# Patient Record
Sex: MALE | Race: WHITE | NOT HISPANIC OR LATINO | Employment: OTHER | URBAN - METROPOLITAN AREA
[De-identification: names, ages, dates, MRNs, and addresses within clinical notes are randomized per-mention and may not be internally consistent; named-entity substitution may affect disease eponyms.]

---

## 2018-09-09 ENCOUNTER — APPOINTMENT (EMERGENCY)
Dept: RADIOLOGY | Facility: HOSPITAL | Age: 83
End: 2018-09-09
Payer: MEDICARE

## 2018-09-09 ENCOUNTER — APPOINTMENT (EMERGENCY)
Dept: CT IMAGING | Facility: HOSPITAL | Age: 83
End: 2018-09-09
Payer: MEDICARE

## 2018-09-09 ENCOUNTER — HOSPITAL ENCOUNTER (OUTPATIENT)
Facility: HOSPITAL | Age: 83
Setting detail: OBSERVATION
Discharge: HOME HEALTH | End: 2018-09-10
Attending: EMERGENCY MEDICINE | Admitting: GENERAL PRACTICE
Payer: MEDICARE

## 2018-09-09 DIAGNOSIS — T14.8XXA MULTIPLE SKIN TEARS: ICD-10-CM

## 2018-09-09 DIAGNOSIS — R29.6 MULTIPLE FALLS: ICD-10-CM

## 2018-09-09 DIAGNOSIS — S70.01XA HEMATOMA OF RIGHT HIP: Primary | ICD-10-CM

## 2018-09-09 DIAGNOSIS — L03.113 CELLULITIS OF RIGHT FOREARM: ICD-10-CM

## 2018-09-09 DIAGNOSIS — R60.0 EDEMA AT INJECTION SITE: ICD-10-CM

## 2018-09-09 PROBLEM — I50.32 CHRONIC DIASTOLIC CONGESTIVE HEART FAILURE (HCC): Status: ACTIVE | Noted: 2017-11-21

## 2018-09-09 PROBLEM — I50.31 ACUTE DIASTOLIC CHF (CONGESTIVE HEART FAILURE) (HCC): Status: ACTIVE | Noted: 2017-12-11

## 2018-09-09 PROBLEM — I25.10 ATHEROSCLEROTIC HEART DISEASE OF NATIVE CORONARY ARTERY WITHOUT ANGINA PECTORIS: Status: ACTIVE | Noted: 2017-11-21

## 2018-09-09 PROBLEM — C44.602 SKIN CANCER OF ARM, RIGHT: Status: ACTIVE | Noted: 2017-11-21

## 2018-09-09 PROBLEM — M48.02 CERVICAL SPINAL STENOSIS: Status: ACTIVE | Noted: 2017-11-21

## 2018-09-09 PROBLEM — R26.2 AMBULATORY DYSFUNCTION: Status: ACTIVE | Noted: 2018-09-09

## 2018-09-09 PROBLEM — I05.9 MITRAL VALVE DISORDER: Status: ACTIVE | Noted: 2018-02-01

## 2018-09-09 PROBLEM — N17.9 ACUTE RENAL FAILURE SUPERIMPOSED ON STAGE 3 CHRONIC KIDNEY DISEASE (HCC): Status: ACTIVE | Noted: 2017-12-11

## 2018-09-09 PROBLEM — N18.30 ACUTE RENAL FAILURE SUPERIMPOSED ON STAGE 3 CHRONIC KIDNEY DISEASE (HCC): Status: ACTIVE | Noted: 2017-12-11

## 2018-09-09 PROBLEM — S41.119A SKIN TEAR OF UPPER ARM WITHOUT COMPLICATION: Status: ACTIVE | Noted: 2018-09-09

## 2018-09-09 PROBLEM — E78.00 PURE HYPERCHOLESTEROLEMIA: Status: ACTIVE | Noted: 2017-11-21

## 2018-09-09 PROBLEM — I10 ESSENTIAL (PRIMARY) HYPERTENSION: Status: ACTIVE | Noted: 2017-11-21

## 2018-09-09 PROBLEM — E55.9 VITAMIN D DEFICIENCY: Status: ACTIVE | Noted: 2017-11-21

## 2018-09-09 PROBLEM — I48.0 PAROXYSMAL ATRIAL FIBRILLATION (HCC): Status: ACTIVE | Noted: 2018-07-31

## 2018-09-09 PROBLEM — R33.9 URINARY RETENTION: Status: ACTIVE | Noted: 2017-12-11

## 2018-09-09 PROBLEM — E11.9 CONTROLLED TYPE 2 DIABETES MELLITUS WITHOUT COMPLICATION, WITHOUT LONG-TERM CURRENT USE OF INSULIN (HCC): Status: ACTIVE | Noted: 2017-11-21

## 2018-09-09 PROBLEM — R42 DIZZINESS: Status: ACTIVE | Noted: 2017-11-21

## 2018-09-09 LAB
ALBUMIN SERPL BCP-MCNC: 3.2 G/DL (ref 3.5–5)
ALP SERPL-CCNC: 56 U/L (ref 46–116)
ALT SERPL W P-5'-P-CCNC: 26 U/L (ref 12–78)
ANION GAP SERPL CALCULATED.3IONS-SCNC: 3 MMOL/L (ref 4–13)
APTT PPP: 26 SECONDS (ref 24–36)
AST SERPL W P-5'-P-CCNC: 26 U/L (ref 5–45)
ATRIAL RATE: 75 BPM
BASOPHILS # BLD AUTO: 0.01 THOUSANDS/ΜL (ref 0–0.1)
BASOPHILS NFR BLD AUTO: 0 % (ref 0–1)
BILIRUB DIRECT SERPL-MCNC: 0.24 MG/DL (ref 0–0.2)
BILIRUB SERPL-MCNC: 0.8 MG/DL (ref 0.2–1)
BILIRUB UR QL STRIP: NEGATIVE
BUN SERPL-MCNC: 27 MG/DL (ref 5–25)
CALCIUM SERPL-MCNC: 8.8 MG/DL (ref 8.3–10.1)
CHLORIDE SERPL-SCNC: 102 MMOL/L (ref 100–108)
CLARITY UR: CLEAR
CO2 SERPL-SCNC: 36 MMOL/L (ref 21–32)
COLOR UR: YELLOW
CREAT SERPL-MCNC: 1.26 MG/DL (ref 0.6–1.3)
EOSINOPHIL # BLD AUTO: 0.22 THOUSAND/ΜL (ref 0–0.61)
EOSINOPHIL NFR BLD AUTO: 3 % (ref 0–6)
ERYTHROCYTE [DISTWIDTH] IN BLOOD BY AUTOMATED COUNT: 14.4 % (ref 11.6–15.1)
GFR SERPL CREATININE-BSD FRML MDRD: 51 ML/MIN/1.73SQ M
GLUCOSE SERPL-MCNC: 104 MG/DL (ref 65–140)
GLUCOSE UR STRIP-MCNC: NEGATIVE MG/DL
HCT VFR BLD AUTO: 37.4 % (ref 36.5–49.3)
HGB BLD-MCNC: 12.1 G/DL (ref 12–17)
HGB UR QL STRIP.AUTO: NEGATIVE
IMM GRANULOCYTES # BLD AUTO: 0.02 THOUSAND/UL (ref 0–0.2)
IMM GRANULOCYTES NFR BLD AUTO: 0 % (ref 0–2)
INR PPP: 1.19 (ref 0.86–1.17)
KETONES UR STRIP-MCNC: NEGATIVE MG/DL
LEUKOCYTE ESTERASE UR QL STRIP: NEGATIVE
LIPASE SERPL-CCNC: 149 U/L (ref 73–393)
LYMPHOCYTES # BLD AUTO: 1.27 THOUSANDS/ΜL (ref 0.6–4.47)
LYMPHOCYTES NFR BLD AUTO: 15 % (ref 14–44)
MAGNESIUM SERPL-MCNC: 1.7 MG/DL (ref 1.6–2.6)
MCH RBC QN AUTO: 29.9 PG (ref 26.8–34.3)
MCHC RBC AUTO-ENTMCNC: 32.4 G/DL (ref 31.4–37.4)
MCV RBC AUTO: 92 FL (ref 82–98)
MONOCYTES # BLD AUTO: 0.67 THOUSAND/ΜL (ref 0.17–1.22)
MONOCYTES NFR BLD AUTO: 8 % (ref 4–12)
NEUTROPHILS # BLD AUTO: 6.21 THOUSANDS/ΜL (ref 1.85–7.62)
NEUTS SEG NFR BLD AUTO: 74 % (ref 43–75)
NITRITE UR QL STRIP: NEGATIVE
NRBC BLD AUTO-RTO: 0 /100 WBCS
P AXIS: 83 DEGREES
PH UR STRIP.AUTO: 6 [PH] (ref 4.5–8)
PHOSPHATE SERPL-MCNC: 3.3 MG/DL (ref 2.3–4.1)
PLATELET # BLD AUTO: 133 THOUSANDS/UL (ref 149–390)
PMV BLD AUTO: 11.5 FL (ref 8.9–12.7)
POTASSIUM SERPL-SCNC: 4.3 MMOL/L (ref 3.5–5.3)
PR INTERVAL: 146 MS
PROT SERPL-MCNC: 6.5 G/DL (ref 6.4–8.2)
PROT UR STRIP-MCNC: NEGATIVE MG/DL
PROTHROMBIN TIME: 15 SECONDS (ref 11.8–14.2)
QRS AXIS: 65 DEGREES
QRSD INTERVAL: 88 MS
QT INTERVAL: 416 MS
QTC INTERVAL: 464 MS
RBC # BLD AUTO: 4.05 MILLION/UL (ref 3.88–5.62)
SODIUM SERPL-SCNC: 141 MMOL/L (ref 136–145)
SP GR UR STRIP.AUTO: <=1.005 (ref 1–1.03)
T WAVE AXIS: 56 DEGREES
TROPONIN I SERPL-MCNC: 0.03 NG/ML
TROPONIN I SERPL-MCNC: 0.04 NG/ML
UROBILINOGEN UR QL STRIP.AUTO: 0.2 E.U./DL
VENTRICULAR RATE: 75 BPM
WBC # BLD AUTO: 8.4 THOUSAND/UL (ref 4.31–10.16)

## 2018-09-09 PROCEDURE — 80048 BASIC METABOLIC PNL TOTAL CA: CPT | Performed by: EMERGENCY MEDICINE

## 2018-09-09 PROCEDURE — 87070 CULTURE OTHR SPECIMN AEROBIC: CPT | Performed by: EMERGENCY MEDICINE

## 2018-09-09 PROCEDURE — 71046 X-RAY EXAM CHEST 2 VIEWS: CPT

## 2018-09-09 PROCEDURE — 73564 X-RAY EXAM KNEE 4 OR MORE: CPT

## 2018-09-09 PROCEDURE — 99285 EMERGENCY DEPT VISIT HI MDM: CPT

## 2018-09-09 PROCEDURE — 96375 TX/PRO/DX INJ NEW DRUG ADDON: CPT

## 2018-09-09 PROCEDURE — 99223 1ST HOSP IP/OBS HIGH 75: CPT | Performed by: GENERAL PRACTICE

## 2018-09-09 PROCEDURE — 85730 THROMBOPLASTIN TIME PARTIAL: CPT | Performed by: EMERGENCY MEDICINE

## 2018-09-09 PROCEDURE — 84484 ASSAY OF TROPONIN QUANT: CPT | Performed by: EMERGENCY MEDICINE

## 2018-09-09 PROCEDURE — 85610 PROTHROMBIN TIME: CPT | Performed by: EMERGENCY MEDICINE

## 2018-09-09 PROCEDURE — 80076 HEPATIC FUNCTION PANEL: CPT | Performed by: EMERGENCY MEDICINE

## 2018-09-09 PROCEDURE — 93005 ELECTROCARDIOGRAM TRACING: CPT

## 2018-09-09 PROCEDURE — 96361 HYDRATE IV INFUSION ADD-ON: CPT

## 2018-09-09 PROCEDURE — 73502 X-RAY EXAM HIP UNI 2-3 VIEWS: CPT

## 2018-09-09 PROCEDURE — 84100 ASSAY OF PHOSPHORUS: CPT | Performed by: EMERGENCY MEDICINE

## 2018-09-09 PROCEDURE — 70450 CT HEAD/BRAIN W/O DYE: CPT

## 2018-09-09 PROCEDURE — 83735 ASSAY OF MAGNESIUM: CPT | Performed by: EMERGENCY MEDICINE

## 2018-09-09 PROCEDURE — 96365 THER/PROPH/DIAG IV INF INIT: CPT

## 2018-09-09 PROCEDURE — 93010 ELECTROCARDIOGRAM REPORT: CPT | Performed by: INTERNAL MEDICINE

## 2018-09-09 PROCEDURE — 87205 SMEAR GRAM STAIN: CPT | Performed by: EMERGENCY MEDICINE

## 2018-09-09 PROCEDURE — 73701 CT LOWER EXTREMITY W/DYE: CPT

## 2018-09-09 PROCEDURE — 36415 COLL VENOUS BLD VENIPUNCTURE: CPT | Performed by: EMERGENCY MEDICINE

## 2018-09-09 PROCEDURE — 81003 URINALYSIS AUTO W/O SCOPE: CPT | Performed by: EMERGENCY MEDICINE

## 2018-09-09 PROCEDURE — 96360 HYDRATION IV INFUSION INIT: CPT

## 2018-09-09 PROCEDURE — 83690 ASSAY OF LIPASE: CPT | Performed by: EMERGENCY MEDICINE

## 2018-09-09 PROCEDURE — 85025 COMPLETE CBC W/AUTO DIFF WBC: CPT | Performed by: EMERGENCY MEDICINE

## 2018-09-09 RX ORDER — BACITRACIN, NEOMYCIN, POLYMYXIN B 400; 3.5; 5 [USP'U]/G; MG/G; [USP'U]/G
1 OINTMENT TOPICAL 2 TIMES DAILY
Status: DISCONTINUED | OUTPATIENT
Start: 2018-09-09 | End: 2018-09-10 | Stop reason: HOSPADM

## 2018-09-09 RX ORDER — POTASSIUM CHLORIDE 1.5 G/1.77G
10 POWDER, FOR SOLUTION ORAL DAILY
COMMUNITY
End: 2018-09-09 | Stop reason: CLARIF

## 2018-09-09 RX ORDER — OXYCODONE HYDROCHLORIDE 5 MG/1
10 CAPSULE ORAL EVERY 6 HOURS PRN
COMMUNITY

## 2018-09-09 RX ORDER — FUROSEMIDE 40 MG/1
40 TABLET ORAL AS NEEDED
COMMUNITY

## 2018-09-09 RX ORDER — ATORVASTATIN CALCIUM 10 MG/1
10 TABLET, FILM COATED ORAL
COMMUNITY

## 2018-09-09 RX ORDER — LORAZEPAM 1 MG/1
1 TABLET ORAL
Status: DISCONTINUED | OUTPATIENT
Start: 2018-09-09 | End: 2018-09-10 | Stop reason: HOSPADM

## 2018-09-09 RX ORDER — ATENOLOL 25 MG/1
25 TABLET ORAL DAILY
COMMUNITY
Start: 2018-02-05 | End: 2018-09-09 | Stop reason: CLARIF

## 2018-09-09 RX ORDER — FUROSEMIDE 40 MG/1
40 TABLET ORAL DAILY
COMMUNITY
Start: 2018-02-19 | End: 2018-09-09 | Stop reason: CLARIF

## 2018-09-09 RX ORDER — PANTOPRAZOLE SODIUM 40 MG/1
40 TABLET, DELAYED RELEASE ORAL
Status: DISCONTINUED | OUTPATIENT
Start: 2018-09-10 | End: 2018-09-10 | Stop reason: HOSPADM

## 2018-09-09 RX ORDER — POTASSIUM CHLORIDE 750 MG/1
10 TABLET, FILM COATED, EXTENDED RELEASE ORAL 2 TIMES DAILY
COMMUNITY

## 2018-09-09 RX ORDER — ONDANSETRON 2 MG/ML
4 INJECTION INTRAMUSCULAR; INTRAVENOUS ONCE
Status: COMPLETED | OUTPATIENT
Start: 2018-09-09 | End: 2018-09-09

## 2018-09-09 RX ORDER — HYDROCHLOROTHIAZIDE 25 MG/1
50 TABLET ORAL DAILY
COMMUNITY

## 2018-09-09 RX ORDER — OXYCODONE HYDROCHLORIDE 5 MG/1
5 TABLET ORAL EVERY 6 HOURS PRN
Status: DISCONTINUED | OUTPATIENT
Start: 2018-09-09 | End: 2018-09-10 | Stop reason: HOSPADM

## 2018-09-09 RX ORDER — FUROSEMIDE 40 MG/1
40 TABLET ORAL DAILY
Status: DISCONTINUED | OUTPATIENT
Start: 2018-09-10 | End: 2018-09-10 | Stop reason: HOSPADM

## 2018-09-09 RX ORDER — MORPHINE SULFATE 4 MG/ML
4 INJECTION, SOLUTION INTRAMUSCULAR; INTRAVENOUS ONCE
Status: COMPLETED | OUTPATIENT
Start: 2018-09-09 | End: 2018-09-09

## 2018-09-09 RX ORDER — POTASSIUM CHLORIDE 750 MG/1
10 TABLET, EXTENDED RELEASE ORAL 2 TIMES DAILY
Status: DISCONTINUED | OUTPATIENT
Start: 2018-09-09 | End: 2018-09-10 | Stop reason: HOSPADM

## 2018-09-09 RX ORDER — LISINOPRIL 20 MG/1
20 TABLET ORAL DAILY
Status: DISCONTINUED | OUTPATIENT
Start: 2018-09-10 | End: 2018-09-10 | Stop reason: HOSPADM

## 2018-09-09 RX ORDER — ENALAPRIL MALEATE 10 MG/1
10 TABLET ORAL DAILY
COMMUNITY

## 2018-09-09 RX ORDER — POLYETHYLENE GLYCOL 1000
1 POWDER (GRAM) MISCELLANEOUS
COMMUNITY

## 2018-09-09 RX ORDER — PHENOL 1.4 %
600 AEROSOL, SPRAY (ML) MUCOUS MEMBRANE 2 TIMES DAILY WITH MEALS
COMMUNITY

## 2018-09-09 RX ORDER — HYDROCHLOROTHIAZIDE 25 MG/1
50 TABLET ORAL DAILY
Status: DISCONTINUED | OUTPATIENT
Start: 2018-09-10 | End: 2018-09-10 | Stop reason: HOSPADM

## 2018-09-09 RX ORDER — CLOTRIMAZOLE AND BETAMETHASONE DIPROPIONATE 10; .64 MG/G; MG/G
CREAM TOPICAL
COMMUNITY
Start: 2017-09-28

## 2018-09-09 RX ORDER — OMEPRAZOLE 20 MG/1
CAPSULE, DELAYED RELEASE ORAL
COMMUNITY

## 2018-09-09 RX ORDER — CALCIUM CARBONATE 500(1250)
1 TABLET ORAL
Status: DISCONTINUED | OUTPATIENT
Start: 2018-09-10 | End: 2018-09-10 | Stop reason: HOSPADM

## 2018-09-09 RX ORDER — ATORVASTATIN CALCIUM 10 MG/1
10 TABLET, FILM COATED ORAL DAILY
COMMUNITY
Start: 2018-07-02 | End: 2018-09-09 | Stop reason: CLARIF

## 2018-09-09 RX ORDER — OXYCODONE AND ACETAMINOPHEN 10; 325 MG/1; MG/1
TABLET ORAL
COMMUNITY
Start: 2017-11-06 | End: 2018-09-09 | Stop reason: CLARIF

## 2018-09-09 RX ORDER — POLYETHYLENE GLYCOL 3350 17 G/17G
17 POWDER, FOR SOLUTION ORAL DAILY
Status: DISCONTINUED | OUTPATIENT
Start: 2018-09-10 | End: 2018-09-10 | Stop reason: HOSPADM

## 2018-09-09 RX ORDER — ATENOLOL 25 MG/1
12.5 TABLET ORAL 2 TIMES DAILY
COMMUNITY

## 2018-09-09 RX ORDER — CLOTRIMAZOLE AND BETAMETHASONE DIPROPIONATE 10; .64 MG/G; MG/G
CREAM TOPICAL 2 TIMES DAILY
Status: DISCONTINUED | OUTPATIENT
Start: 2018-09-09 | End: 2018-09-10 | Stop reason: HOSPADM

## 2018-09-09 RX ORDER — ATORVASTATIN CALCIUM 10 MG/1
10 TABLET, FILM COATED ORAL
Status: DISCONTINUED | OUTPATIENT
Start: 2018-09-09 | End: 2018-09-10 | Stop reason: HOSPADM

## 2018-09-09 RX ORDER — ENALAPRIL MALEATE 10 MG/1
10 TABLET ORAL DAILY
COMMUNITY
Start: 2018-06-29 | End: 2018-09-09 | Stop reason: CLARIF

## 2018-09-09 RX ORDER — LORAZEPAM 1 MG/1
1 TABLET ORAL
COMMUNITY

## 2018-09-09 RX ORDER — ATENOLOL 25 MG/1
12.5 TABLET ORAL 2 TIMES DAILY
Status: DISCONTINUED | OUTPATIENT
Start: 2018-09-09 | End: 2018-09-10 | Stop reason: HOSPADM

## 2018-09-09 RX ADMIN — CEFTRIAXONE 2000 MG: 2 INJECTION, SOLUTION INTRAVENOUS at 15:20

## 2018-09-09 RX ADMIN — POTASSIUM CHLORIDE 10 MEQ: 750 TABLET, EXTENDED RELEASE ORAL at 21:02

## 2018-09-09 RX ADMIN — SODIUM CHLORIDE 1000 ML: 0.9 INJECTION, SOLUTION INTRAVENOUS at 12:37

## 2018-09-09 RX ADMIN — APIXABAN 2.5 MG: 2.5 TABLET, FILM COATED ORAL at 20:59

## 2018-09-09 RX ADMIN — IOHEXOL 100 ML: 350 INJECTION, SOLUTION INTRAVENOUS at 14:56

## 2018-09-09 RX ADMIN — ONDANSETRON 4 MG: 2 INJECTION INTRAMUSCULAR; INTRAVENOUS at 14:04

## 2018-09-09 RX ADMIN — MORPHINE SULFATE 4 MG: 4 INJECTION INTRAVENOUS at 14:05

## 2018-09-09 RX ADMIN — LORAZEPAM 1 MG: 1 TABLET ORAL at 22:00

## 2018-09-09 RX ADMIN — OXYCODONE HYDROCHLORIDE 5 MG: 5 TABLET ORAL at 23:21

## 2018-09-09 NOTE — H&P
[address] [facility]  H&P- Vida Diaz 2/22/1930, 80 y o  male MRN: [id number]    Unit/Bed#: -01 Encounter: [id number]    Primary Care Provider: Ailyn Martin MD   Date and time admitted to hospital: 9/9/2018 11:28 AM        * Skin tear of upper arm without complication   Assessment & Plan    · Patient fell at home and suffered bilateral upper arm skin tears  · Dry dressing with nonstick gauze  · Neosporin  · No leukocytosis negative for fever  · Localized redness around skin tears  · Swelling is likely related to IV infiltrated in the ED swelling was not evident prior to this        Ambulatory dysfunction   Assessment & Plan    · Patient experienced a fall at home  · Fall precautions  · Right hip hematoma  · CT of hip negative  · Consult PT        Acute renal failure superimposed on stage 3 chronic kidney disease ([facility])   Assessment & Plan    Stable        Acute diastolic CHF (congestive heart failure) ([facility])   Assessment & Plan    · Stable               VTE Prophylaxis: Apixaban (Eliquis)  / sequential compression device   Code Status:  Full  POLST: POLST form is not discussed and not completed at this time  Discussion with family:  Not available    Anticipated Length of Stay:  Patient will be admitted on an Inpatient basis with an anticipated length of stay of  > 2 midnights  Justification for Hospital Stay:  Skin tear wound dressings, evaluation of ambulatory dysfunction by Physical therapy    Total Time for Visit, including Counseling / Coordination of Care: 30 minutes  Greater than 50% of this total time spent on direct patient counseling and coordination of care  Chief Complaint:   States he fell 2 weeks ago and has a bruise on his hip with hip pain    History of Present Illness:    Vida Diaz is a 80 y o  male who presents with fall  Patient came into the ED because he fell 2 weeks ago and was brought in by family    Patient denies any loss of consciousness or hitting his head   He does however still have a hip that is exhibiting signs of healing  However he is still experiencing pain  Patient and family concerned because he is on Eliquis for AFib  He also experienced bilateral upper arm skin tears from his fall  Local wound care  Denies any chest pain chest tightness shortness of breath or difficulty breathing  He is ambulating in his room without difficulty  He does not use any assistive devices at home however since his fall he has been using a walker  Denies any numbness or tingling in his right extremity  No changes in sensation  Denies a history of smoking drinking or illicit drug use    Review of Systems:    Review of Systems   Constitutional: Negative  HENT: Negative  Eyes: Negative  Respiratory: Negative  Cardiovascular: Negative  Gastrointestinal: Negative  Endocrine: Negative  Genitourinary: Negative  Musculoskeletal: Negative  Right hip pain   Skin: Negative  Allergic/Immunologic: Negative  Neurological: Negative  Psychiatric/Behavioral: Negative  All other systems reviewed and are negative  Past Medical and Surgical History:     History reviewed  No pertinent past medical history  History reviewed  No pertinent surgical history  Meds/Allergies:    Prior to Admission medications    Medication Sig Start Date End Date Taking?  Authorizing Provider   apixaban (ELIQUIS) 2 5 mg Take 2 5 mg by mouth 2 (two) times a day   Yes Historical Provider, MD   atenolol (TENORMIN) 25 mg tablet Take 12 5 mg by mouth 2 (two) times a day   Yes Historical Provider, MD   atorvastatin (LIPITOR) 10 mg tablet Take 10 mg by mouth daily at bedtime   Yes Historical Provider, MD   calcium carbonate (OS-NICOLE) 600 MG tablet Take 600 mg by mouth 2 (two) times a day with meals   Yes Historical Provider, MD   clotrimazole-betamethasone (LOTRISONE) 1-0 05 % cream 1 pat, Topical, Daily PRN 9/28/17  Yes Historical Provider, MD   diclofenac sodium (VOLTAREN) 1 % Apply 1 application topically 9/6/72 9/6/19 Yes Historical Provider, MD   enalapril (VASOTEC) 10 mg tablet Take 10 mg by mouth daily   Yes Historical Provider, MD   hydrochlorothiazide (HYDRODIURIL) 25 mg tablet Take 50 mg by mouth daily   Yes Historical Provider, MD   LORazepam (ATIVAN) 1 mg tablet Take 1 mg by mouth daily at bedtime   Yes Historical Provider, MD   oxyCODONE (OXY-IR) 5 MG capsule Take 10 mg by mouth every 6 (six) hours as needed for moderate pain   Yes Historical Provider, MD   apixaban (ELIQUIS) 2 5 mg Take 2 5 mg by mouth 2 (two) times a day 2/1/18 9/9/18 Yes Historical Provider, MD   atenolol (TENORMIN) 25 mg tablet Take 25 mg by mouth daily 2/5/18 9/9/18 Yes Historical Provider, MD   atorvastatin (LIPITOR) 10 mg tablet Take 10 mg by mouth daily 7/2/18 9/9/18 Yes Historical Provider, MD   enalapril (VASOTEC) 10 mg tablet Take 10 mg by mouth daily 6/29/18 9/9/18 Yes Historical Provider, MD   furosemide (LASIX) 40 mg tablet Take 40 mg by mouth daily 2/19/18 9/9/18 Yes Historical Provider, MD   oxyCODONE-acetaminophen (PERCOCET)  mg per tablet Take one tablet by mouth TID PRN 11/6/17 9/9/18 Yes Historical Provider, MD   potassium chloride (KLOR-CON) 20 mEq packet Take 10 mEq by mouth daily  9/9/18 Yes Historical Provider, MD   furosemide (LASIX) 40 mg tablet Take 40 mg by mouth as needed    Historical Provider, MD   Multiple Vitamins-Minerals (MULTIVITAMIN ADULT PO) Take 1 tab(s) by mouth Daily      Historical Provider, MD   omeprazole (PRILOSEC) 20 mg delayed release capsule 1 tablet    Historical Provider, MD   Polyethylene Glycol 1000 POWD 1 packet by Does not apply route    Historical Provider, MD   potassium chloride (K-DUR) 10 mEq tablet Take 10 mEq by mouth 2 (two) times a day    Historical Provider, MD   Calcium Carbonate-Vitamin D3 600-400 MG-UNIT TABS Take 1 tab(s) by mouth Daily  9/9/18  Historical Provider, MD     I have reviewed home medications with patient personally  Allergies: No Known Allergies    Social History:     Marital Status: /Civil Union   Occupation:  Retired  Patient Pre-hospital Living Situation:  With wife and daughter  Patient Pre-hospital Level of Mobility:  Independent with the use of walker  Patient Pre-hospital Diet Restrictions:  None  Substance Use History:   History   Alcohol Use No     History   Smoking Status    Never Smoker   Smokeless Tobacco    Never Used     History   Drug Use No       Family History:    History reviewed  No pertinent family history  Physical Exam:     Vitals:   Blood Pressure: 142/77 (09/09/18 1738)  Pulse: 81 (09/09/18 1738)  Temperature: 98 4 °F (36 9 °C) (09/09/18 1738)  Temp Source: Oral (09/09/18 1738)  Respirations: 18 (09/09/18 1738)  Height: 5' 7" (170 2 cm) (09/09/18 1738)  Weight - Scale: 59 8 kg (131 lb 13 4 oz) (09/09/18 1239)  SpO2: 94 % (09/09/18 1738)    Physical Exam   Constitutional: He appears well-developed and well-nourished  HENT:   Head: Normocephalic  Eyes: Pupils are equal, round, and reactive to light  Neck: Normal range of motion  Neck supple  Cardiovascular: Normal rate and regular rhythm  Pulmonary/Chest: Effort normal and breath sounds normal    Abdominal: Soft  Bowel sounds are normal    Musculoskeletal:        Right hip: He exhibits tenderness and swelling  Right   Neurological: He is alert  Skin: Skin is warm and dry  Psychiatric: He has a normal mood and affect  His speech is normal and behavior is normal  Judgment and thought content normal  Cognition and memory are normal    Nursing note and vitals reviewed  Additional Data:     Lab Results: I have personally reviewed pertinent reports          Results from last 7 days  Lab Units 09/09/18  1235   WBC Thousand/uL 8 40   HEMOGLOBIN g/dL 12 1   HEMATOCRIT % 37 4   PLATELETS Thousands/uL 133*   NEUTROS PCT % 74   LYMPHS PCT % 15   MONOS PCT % 8   EOS PCT % 3       Results from last 7 days  Lab Units 09/09/18  1235   SODIUM mmol/L 141   POTASSIUM mmol/L 4 3   CHLORIDE mmol/L 102   CO2 mmol/L 36*   BUN mg/dL 27*   CREATININE mg/dL 1 26   CALCIUM mg/dL 8 8   ALK PHOS U/L 56   ALT U/L 26   AST U/L 26       Results from last 7 days  Lab Units 09/09/18  1235   INR  1 19*               Imaging: I have personally reviewed pertinent reports  XR chest 2 views   Final Result by Inocente Velázquez MD (09/09 1715)      No acute cardiopulmonary disease  Workstation performed: [id number]         XR hip/pelv 2-3 vws right if performed   Final Result by Viviana Bales MD (09/09 1714)      No acute osseous abnormality  Workstation performed: [id number]         XR knee 4+ vw right injury   Final Result by Denise Escobar DO (09/09 1711)   Mottled osteopenia  No acute fracture or dislocation  Remarkably minimal degenerative change considering patient's advanced age  Workstation performed: [id number]         CT hip right w contrast   Final Result by Yoel Callejas MD (09/09 1523)         1  No hematoma, mass or abnormal fluid collection in the region of the right hip  2   No acute osseous abnormality  3   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

## 2018-09-09 NOTE — PLAN OF CARE
Problem: SAFETY ADULT  Goal: Patient will remain free of falls  INTERVENTIONS:  - Assess patient frequently for physical needs  -  Identify cognitive and physical deficits and behaviors that affect risk of falls    -  [place] fall precautions as indicated by assessment   - Educate patient/family on patient safety including physical limitations  - Instruct patient to call for assistance with activity based on assessment  - Modify environment to reduce risk of injury  - Consider OT/PT consult to assist with strengthening/mobility  Outcome: Progressing

## 2018-09-09 NOTE — ASSESSMENT & PLAN NOTE
· Patient experienced a fall at home  · Fall precautions  · Right hip hematoma  · CT of hip negative  · Consult PT

## 2018-09-09 NOTE — ASSESSMENT & PLAN NOTE
· Patient fell at home and suffered bilateral upper arm skin tears  · Dry dressing with nonstick gauze  · Neosporin  · No leukocytosis negative for fever  · Localized redness around skin tears  · Swelling is likely related to IV infiltrated in the ED swelling was not evident prior to this
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
## 2018-09-10 VITALS
BODY MASS INDEX: 20.69 KG/M2 | SYSTOLIC BLOOD PRESSURE: 155 MMHG | RESPIRATION RATE: 18 BRPM | HEART RATE: 66 BPM | OXYGEN SATURATION: 94 % | WEIGHT: 131.84 LBS | DIASTOLIC BLOOD PRESSURE: 64 MMHG | TEMPERATURE: 98.2 F | HEIGHT: 67 IN

## 2018-09-10 PROCEDURE — G8978 MOBILITY CURRENT STATUS: HCPCS

## 2018-09-10 PROCEDURE — 97110 THERAPEUTIC EXERCISES: CPT

## 2018-09-10 PROCEDURE — 97116 GAIT TRAINING THERAPY: CPT

## 2018-09-10 PROCEDURE — 99217 PR OBSERVATION CARE DISCHARGE MANAGEMENT: CPT | Performed by: GENERAL PRACTICE

## 2018-09-10 PROCEDURE — 97163 PT EVAL HIGH COMPLEX 45 MIN: CPT

## 2018-09-10 PROCEDURE — G8979 MOBILITY GOAL STATUS: HCPCS

## 2018-09-10 RX ORDER — BACITRACIN, NEOMYCIN, POLYMYXIN B 400; 3.5; 5 [USP'U]/G; MG/G; [USP'U]/G
OINTMENT TOPICAL 2 TIMES DAILY
Qty: 15 G | Refills: 0 | Status: SHIPPED | OUTPATIENT
Start: 2018-09-10

## 2018-09-10 RX ADMIN — Medication 1 TABLET: at 09:04

## 2018-09-10 RX ADMIN — LISINOPRIL 20 MG: 20 TABLET ORAL at 09:04

## 2018-09-10 RX ADMIN — OXYCODONE HYDROCHLORIDE 5 MG: 5 TABLET ORAL at 05:40

## 2018-09-10 RX ADMIN — APIXABAN 2.5 MG: 2.5 TABLET, FILM COATED ORAL at 09:05

## 2018-09-10 RX ADMIN — POLYETHYLENE GLYCOL 3350 17 G: 17 POWDER, FOR SOLUTION ORAL at 09:05

## 2018-09-10 RX ADMIN — HYDROCHLOROTHIAZIDE 50 MG: 25 TABLET ORAL at 09:05

## 2018-09-10 RX ADMIN — BACITRACIN, NEOMYCIN, POLYMYXIN B 1 SMALL APPLICATION: 400; 3.5; 5 OINTMENT TOPICAL at 09:05

## 2018-09-10 RX ADMIN — FUROSEMIDE 40 MG: 40 TABLET ORAL at 09:05

## 2018-09-10 RX ADMIN — PANTOPRAZOLE SODIUM 40 MG: 40 TABLET, DELAYED RELEASE ORAL at 05:39

## 2018-09-10 RX ADMIN — CLOTRIMAZOLE AND BETAMETHASONE DIPROPIONATE: 10; .5 CREAM TOPICAL at 09:06

## 2018-09-10 RX ADMIN — Medication 1 TABLET: at 09:05

## 2018-09-10 RX ADMIN — POTASSIUM CHLORIDE 10 MEQ: 750 TABLET, EXTENDED RELEASE ORAL at 09:05

## 2018-09-10 RX ADMIN — ATENOLOL 12.5 MG: 25 TABLET ORAL at 09:04

## 2018-09-10 NOTE — PHYSICAL THERAPY NOTE
Physical Therapy Evaluation     Patient's Name: Ibis Tapia    Admitting Diagnosis  Hip pain [M25 559]  Hematoma of right hip [S70 01XA]  Cellulitis of right forearm [S02 574]  Multiple falls [R29 6]  Multiple skin tears [T14  8XXA]  Edema at injection site [R60 0]    Problem List  Patient Active Problem List   Diagnosis    Acute diastolic CHF (congestive heart failure) (HCC)    Acute renal failure superimposed on stage 3 chronic kidney disease ([facility] [facility]    Atherosclerotic heart disease of native coronary artery without angina pectoris    Cervical spinal stenosis    [facility]    Controlled type 2 diabetes mellitus without complication, without long-term current use of insulin (HCC)    Dizziness    Essential (primary) hypertension    Mitral valve disorder    Paroxysmal atrial fibrillation ([facility])    Skin cancer of arm, right    Pure hypercholesterolemia    Urinary retention    Vitamin D deficiency    Skin tear of upper arm without complication    Ambulatory dysfunction        09/10/18 1015   Note Type   Note type Eval/Treat   Pain Assessment   Pain Assessment 0-10   Pain Score 9   Pain Type Chronic pain   Pain Location Back   Pain Orientation Lower   Hospital Pain Intervention(s) Ambulation/increased activity;Repositioned;Rest;Emotional support   Response to Interventions tolerated   Multiple Pain Sites Yes   Pain Rating: FLACC (Rest) - Face 0   Pain Rating: FLACC (Rest) - Legs 0   Pain Rating: FLACC (Rest) - Activity 0   Pain Rating: FLACC (Rest) - Cry 1   Pain Rating: FLACC (Rest) - Consolability 1   Score: FLACC (Rest) 2   Pain Rating: FLACC (Activity) - Face 1   Pain Rating: FLACC (Activity) - Legs 0   Pain Rating: FLACC (Activity) - Activity 1   Pain Rating: FLACC (Activity) - Cry 1   Pain Rating: FLACC (Activity) - Consolability 1   Score: FLACC (Activity) 4   Pain 2   Pain Score 2 9   Pain Type 2 Acute pain  (RN notified of pt's pain report)   Pain Location 2 Hip   Pain Orientation 2 Right   Pain Intervention(s) 2 Ambulation/increased activity;Repositioned;Rest;Emotional support   Response to Interventions 2 tolerated   Home Living   Type of [address] Two level;Stairs to enter with rails; Performs ADLs on one level; Able to live on main level with bedroom/bathroom  (3 ARASH, 1st floor set up)   Bathroom Shower/Tub Tub/shower unit   [employer] Grab bars in shower; Shower chair;Hand-held shower;Commode   Bathroom Accessibility Accessible   Home Equipment Walker;Cane;Wheelchair-manual  (lift recliner  RW and rollator)   Prior Function   Level of [place] Independent with ADLs and functional mobility   Lives With Spouse  (dtr lives upstairs)   Receives Help From Family   ADL Assistance Independent   IADLs Needs assistance   Falls in the last 6 months 1 to 4  (2-3 weeks ago had a fall)   Vocational Retired   Restrictions/Precautions   [facility] Bearing Precautions Per Order No   Braces or Orthoses (none per pt)   Other Precautions Chair Alarm; Fall Risk;Pain   General   Family/Caregiver Present Yes  (wife and dtr)   Cognition   Overall Cognitive Status WFL   Arousal/Participation Alert   Orientation Level Oriented X4   Memory Within functional limits   Following Commands Follows all commands and directions without difficulty   Comments pt agreeable to PT evaluation   RUE Assessment   RUE Assessment WFL  (AROM WFL, limited overhead chronic)   LUE Assessment   LUE Assessment WFL  (AROM WFL, limited overhead chronic)   RLE Assessment   RLE Assessment WFL  (grossly 4/5, did not formally MMT R hip 2/2 pain)   LLE Assessment   LLE Assessment WFL  (grossly 4/5)   Coordination   Movements are Fluid and Coordinated 1   Sensation WFL  (pt denying any numbness/tingling)   Light Touch   RLE Light Touch Grossly intact   LLE Light Touch Grossly intact   Bed Mobility   Additional Comments pt received in bathroom upon arrival with RN   RN reporting pt able to perform bed mobility, including supine<>sit transfer with SBA, no external assistance required   Transfers   Sit to Stand 5  Supervision   Additional items Assist x 1; Armrests; Increased time required;Verbal cues   Stand to Sit 5  Supervision   Additional items Assist x 1; Armrests; Increased time required;Verbal cues   Toilet transfer 5  Supervision   Additional items Assist x 1;Verbal cues;Standard toilet  (recommend BSC over standard toilet- educated pt)   Ambulation/Elevation   Gait pattern Decreased foot clearance;Decreased R stance; Short stride; Step to   Gait Assistance 5  Supervision   Additional items Assist x 1;Verbal cues   Assistive Device Rolling walker   Distance 100'   Balance   Static Sitting Good   Dynamic Sitting Good   Static Standing Fair +   Dynamic Standing Fair +   Ambulatory Fair   Higher level balance (Tinetti: 18/28 (high risk for falls))   Endurance Deficit   Endurance Deficit Yes   Activity Tolerance   Activity Tolerance Patient limited by fatigue;Patient limited by pain   Medical Staff Made Aware yes spoke to Dr Tony Wolfe and LEIGHTON Clarke regarding PT recs   Nurse Made Aware yes, RN Ana Maria Kline and Erasmo Crocker verbalized pt appropriate to see, made aware of session outcome/recs   Assessment   Prognosis Good   Problem List Decreased strength;Decreased endurance; Impaired balance;Decreased mobility;Pain;Decreased skin integrity   Assessment Pt is 80 y o  male seen for PT evaluation on 9/10/2018 s/p admit to [facility] on 9/9/2018 w/ Skin tear of upper arm without complication  Pt presents s/p fall  ~2 weeks ago in his kitchen, injuring his R hip  Pt notes difficulty getting up at that time, has been ambulatory since but still c/o pain to R hip  Imaging (-) fx  PT consulted to assess pt's functional mobility and d/c needs  Order placed for PT eval and tx, w/ up w/ A order  Performed at least 2 patient identifiers during session: Name and wristband   Comorbidities affecting pt's physical performance at time of assessment include: A fib, h/o R hip surgery  PTA, pt was independent w/ all functional mobility w/ no AD/DME, ambulates unrestricted distances and all terrain, has 3 ARASH, lives w/ wife and dtr in 2 level home (1st floor set up) and retired  Since falling 2 weeks ago, pt has required use of RW, typically does not need any AD for ambulation  Personal factors affecting pt at time of IE include: inaccessible home environment, ambulating w/ assistive device, stairs to enter home and positive fall history  Please find objective findings from PT assessment regarding body systems outlined above with impairments and limitations including weakness, impaired balance, decreased endurance, gait deviations, pain, decreased activity tolerance and fall risk, as well as mobility assessment (need for SBA assist w/ all phases of mobility when usually ambulating independently and need for cueing for mobility technique)  The following objective measures performed on IE also reveal limitations: Barthel Index: 80/100, Modified Ravi: 3 (moderate disability) and Tinetti/ILAN: 18/28 (high risk for falls)  Pt's clinical presentation is currently unstable/unpredictable seen in pt's presentation of advanced age, (+) fall history, need for input for task focus and mobility technique, R hip and chronic back pain impacting overall mobility status and ongoing medical assessment  Pt to benefit from continued PT tx to address deficits as defined above and maximize level of functional independent mobility and consistency  From PT/mobility standpoint, recommendation at time of d/c would be Home PT with family support pending progress in order to facilitate return to PLOF     Barriers to Discharge None   Barriers to Discharge Comments pt reports dtr home most of the time   Goals   Patient Goals to return home with home care, pt refuses STR   STG Expiration Date 09/20/18   Short Term Goal #1 In 7-10 days: Increase bilateral LE strength 1/2 grade to facilitate independent mobility, Perform all bed mobility tasks modified independent to decrease caregiver burden, Perform all transfers modified independent to improve independence, Ambulate > 300 ft  with least restrictive assistive device modified independent w/o LOB and w/ normalized gait pattern 100% of the time, Navigate 3 stairs modified independent with bilateral handrails to facilitate return to previous living environment, Increase all balance 1/2 grade to decrease risk for falls, Complete exercise program independently, Tolerate 4 hr OOB to faciliate upright tolerance, Improve Barthel Index score to 90 or greater to facilitate independence and Improve Tinetti/ILAN score by 5 points to decrease risk for falls   Treatment Day 0   Plan   Treatment/Interventions Functional transfer training;LE strengthening/ROM; Elevations; Therapeutic exercise; Endurance training;Patient/family training;Equipment eval/education; Bed mobility;Gait training;Spoke to nursing;Spoke to case management;Spoke to MD;Family   PT Frequency (3-5x/wk)   Recommendation   Recommendation Home PT; Home with family support  (anticipated pending progress)   Equipment Recommended Walker  (continue trials with RW)   PT - OK to Discharge No  (pt to clear stairs)   Modified Meridian Scale   Modified Meridian Scale 3   Barthel Index   Feeding 10   Bathing 5   Grooming Score 5   Dressing Score 10   Bladder Score 10   Bowels Score 10   Toilet Use Score 10   Transfers (Bed/Chair) Score 10   Mobility (Level Surface) Score 10   Stairs Score 0   Barthel Index Score 80           Jaci Gaspar, PT, DPT    Physical Therapy Treatment Note  Time In: 1030  Time Out: 1110  Total Time: 40 min     S:  Pt agreeable to PT treatment session s/p PT eval, in no apparent distress, A&O x 4, responsive and motivated to participate   Pt reports he has been ambulatory to/from  since admission, but motivated to trial more distance      O:  Pt seen for PT treatment session this date with interventions consisting of gait training w/ emphasis on improving pt's ability to ambulate level surfaces x 150 ft with SBA provided by therapist with RW and navigate 3 stairs w/ B handrails with step to pattern with SBA  No pain reported throughout exercise reps for exercises including: QS, AP, LAQ x 10 reps  VC required for technique and eccentric control      A:  Pt declining any worsening of R hip and back pain with mobility training  Education on sequencing, management of RW  No overt LOB observed  Pt able to perform narrow based turns and navigate over/around obstacles without gait deviations  Pt denying any lightheadedness/dizziness  Education on stair navigation with visual demonstration, pt verbalizing and demonstrating understanding  VC for B hand/foot placement during sit<>stand transfers for enhanced safety with transfers and B UE eccentric control for descent  Pt w/ good carryover for HEP, verbalizing understanding  PT to assess carryover next session  , Post session: pt returned back to recliner, chair alarm engaged, all needs in reach and RN notified of session findings/recommendations and NSG staff educated/encouraged to mobilize Ax1 with RW      P:  Continue to recommend Home PT with family support at time of d/c in order to maximize pt's functional independence and safety w/ mobility  Pt continues to be functioning below baseline level, and remains limited 2* factors listed above and including at risk for falls  PT will continue to see pt while here in order to address the deficits listed above and provide interventions consistent w/ POC in effort to achieve STGs  Pt able to clear stairs, pt notes he feels very close to his baseline level and has no concerns to return home at this time  Educated pt to continue to use RW upon return home, encouraged use of BSC over toilet for enhanced safety with toilet transfers   PT spoke to Dr Luis Holbrook and Tamera Carcamo regarding session findings and recommendations      Alcides Conrad, PT, DPT

## 2018-09-10 NOTE — DISCHARGE SUMMARY
Discharge Summary - [facility] Internal Medicine    Patient Information: Ibis Tapia 80 y o  male MRN: [id number]  Unit/Bed#: -01 Encounter: [id number]    Discharging Physician / Practitioner: Nai Cristina MD  PCP: Celia Vásquez MD  Admission Date: 9/9/2018  Discharge Date: 09/10/18    Reason for Admission: skin tears, weakness    Discharge Diagnoses:     Principal Problem:    Skin tear of upper arm without complication  Active Problems:    Ambulatory dysfunction  Resolved Problems:    * No resolved hospital problems   *      Consultations During Hospital Stay:  · PT/OT    Procedures Performed:     ·     Significant Findings:     · Multiple skin tears, hematoma    Incidental Findings:   ·      Test Results Pending at Discharge (will require follow up):   ·      Outpatient Tests Requested:  ·     Complications:      Hospital Course:     Ibis Tapia is a 80 y o  male patient who originally presented to the hospital on 9/9/2018 due to fall 2 weeks ago and skin tears             * Skin tear of upper arm without complication   Assessment & Plan     · Patient fell at home and suffered bilateral upper arm skin tears  · Dry dressing with nonstick gauze  · Neosporin  · No leukocytosis negative for fever  · Localized redness around skin tears            Ambulatory dysfunction   Assessment & Plan     · Patient experienced a fall at home  · Fall precautions  · Right hip hematoma  · CT of hip negative  · PT recommends home PT          Acute renal failure superimposed on stage 3 chronic kidney disease ([facility] [address] )   Assessment & Plan     Stable          Ac[facility] diastolic [facility]cong[facility] )   Assessment & Plan     · Stable            Condition at Discharge: stable     Discharge Day Visit / Exam:     Subjective:  Wants to go home-he will do therapy at home  Vitals: Blood Pressure: 155/64 (09/10/18 0700)  Pulse: 66 (09/10/18 0700)  Temperature: 98 2 °F (36 8 °C) (09/10/18 0700)  Temp Source: Oral (09/10/18 0700)  Respirations: 18 (09/10/18 0700)  Height: 5' 7" (170 2 cm) (09/09/18 1738)  Weight - Scale: 59 8 kg (131 lb 13 4 oz) (09/09/18 1239)  SpO2: 94 % (09/10/18 0700)  Exam:   Physical Exam   Constitutional: He appears well-developed  HENT:   Head: Normocephalic and atraumatic  Eyes: Pupils are equal, round, and reactive to light  Cardiovascular: Normal rate and regular rhythm  Pulmonary/Chest: Effort normal and breath sounds normal    Abdominal: Soft  Musculoskeletal: He exhibits no edema  Discharge instructions/Information to patient and family:   See after visit summary for information provided to patient and family  Provisions for Follow-Up Care:  See after visit summary for information related to follow-up care and any pertinent home health orders  Disposition:     Home with VNA Services (Reminder: Complete face to face encounter)    For Discharges to [facility] SNF:   · Not Applicable to this Patient - Not Applicable to this Patient    Planned Readmission:      Discharge Statement:  I spent 40 minutes discharging the patient  This time was spent on the day of discharge  I had direct contact with the patient on the day of discharge  Greater than 50% of the total time was spent examining patient, answering all patient questions, arranging and discussing plan of care with patient as well as directly providing post-discharge instructions  Additional time then spent on discharge activities  Discharge Medications:  See after visit summary for reconciled discharge medications provided to patient and family  ** Please Note: Dragon 360 Dictation voice to text software may have been used in the creation of this document   **

## 2018-09-10 NOTE — CASE MANAGEMENT
Initial Clinical Review    Admission: Date/Time/Statement: IP order 9/9/18 @ 1620 converted to OBS 9/10 1148     Orders Placed This Encounter   Procedures    Inpatient Admission (expected length of stay for this patient is greater than two midnights)     Standing Status:   Standing     Number of Occurrences:   1     Order Specific Question:   Admitting Physician     Answer:   Kumar Leyva [[id number]]     Order Specific Question:   Level of Care     Answer:   Med Surg [[id number]]     Order Specific Question:   Estimated length of stay     Answer:   More than 2 Midnights     Order Specific Question:   Certification     Answer:   I certify that inpatient services are medically necessary for this patient for a duration of greater than two midnights  See H&P and MD Progress Notes for additional information about the patient's course of treatment  ED: Date/Time/Mode of Arrival:   ED Arrival Information     Expected Arrival Acuity Means of Arrival Escorted By Service Admission Type    - 9/9/2018 11:23 Urgent Walk-In Family Member General Medicine Urgent    Arrival Complaint    FALL, HIP PAIN          Chief Complaint:   Chief Complaint   Patient presents with    Fall     patient presents post fall, patient c/o hip pain  patient on eliquis therapy        History of Illness:   Kimberly Medina is a 80 y o  male who presents with fall  Patient came into the ED because he fell 2 weeks ago and was brought in by family  Patient denies any loss of consciousness or hitting his head  He does however still have a hip that is exhibiting signs of healing  However he is still experiencing pain  Patient and family concerned because he is on Eliquis for AFib  He also experienced bilateral upper arm skin tears from his fall  Local wound care  He is ambulating in his room without difficulty        ED Vital Signs:   ED Triage Vitals   Temperature Pulse Respirations Blood Pressure SpO2   09/09/18 1129 09/09/18 1129 09/09/18 1129 09/09/18 1129 09/09/18 1129   98 8 °F (37 1 °C) 95 18 (!) 180/81 99 %      Temp Source Heart Rate Source Patient Position - Orthostatic VS BP Location FiO2 (%)   09/09/18 1738 09/09/18 1221 09/09/18 1221 09/09/18 1221 --   Oral Monitor Lying Left arm       Pain Score       09/09/18 1405       8        Wt Readings from Last 1 Encounters:   09/09/18 59 8 kg (131 lb 13 4 oz)       Vital Signs (abnormal): wnl     Abnormal Labs/Diagnostic Test Results: Co2   36, an gap  3, BUN creat   27  1 26, direct bili   0 24, alb   3 2, pt inr   15 0  1 19, plt   133  CT R hip - 1  No hematoma, mass or abnormal fluid collection in the region of the right hip  2   No acute osseous abnormality  3   Old posttraumatic deformity of the right proximal femur  CT head- wnl   CXR -wnl   R hip- wnl   R knee- wnl   EKG- NSR     ED Treatment:   Medication Administration from 09/09/2018 1123 to 09/09/2018 1733       Date/Time Order Dose Route Action Action by Comments     09/09/2018 1640 sodium chloride 0 9 % bolus 1,000 mL 0 mL Intravenous Stopped Teresita Thompson RN      09/09/2018 1237 sodium chloride 0 9 % bolus 1,000 mL 1,000 mL Intravenous [address] Teresita Thompson RN      09/09/2018 1405 morphine (PF) 4 mg/mL injection 4 mg 4 mg Intravenous Given Teresita Thompson RN      09/09/2018 1404 ondansetron (ZOFRAN) injection 4 mg 4 mg Intravenous Given Teresita Thompson RN      09/09/2018 1640 cefTRIAXone (ROCEPHIN) IVPB (premix) 2,000 mg 0 mg Intravenous Stopped Teresita Thompson RN      09/09/2018 1520 cefTRIAXone (ROCEPHIN) IVPB (premix) 2,000 mg 2,000 mg Intravenous [address] Teresita Thompson RN      09/09/2018 1456 iohexol (OMNIPAQUE) 350 MG/ML injection (MULTI-DOSE) 100 mL 100 mL Intravenous Given Deborah Edgar           Past Medical/Surgical History:    Active Ambulatory Problems     Diagnosis Date Noted    Acute diastolic CHF (congestive heart failure) ([facility] [address] ) 12/11/2017    [facility] renal [facility] on stage [facility] 12/11/2017    Atherosclerotic heart disease of native coronary artery without angina pectoris 11/21/2017    Cervical spinal stenosis 11/21/2017    Chronic diastolic congestive heart failure ([facility] ) 11/21/2017    Controlled type 2 diabetes mellitus without complication, without long-term current use of insulin ([facility] ) 11/21/2017    Dizziness 11/21/2017    Essential (primary) hypertension 11/21/2017    Mitral valve disorder 02/01/2018    Paroxysmal atrial fibrillation ([place] [id number] ) 07/31/2018    Skin cancer of arm, right 11/21/2017    Pure hypercholesterolemia 11/21/2017    Urinary retention 12/11/2017    Vitamin D deficiency 11/21/2017       No Additional Past Medical History       Admitting Diagnosis: Hip pain [M25 559]  Hematoma of right hip [S70 01XA]  Cellulitis of right forearm [L03 113]  Multiple falls [R29 6]  Multiple skin tears [T14  8XXA]  Edema at injection site [R60 0]    Age/Sex: 80 y o  male    Assessment/Plan:  * Skin tear of upper arm without complication   Assessment & Plan     · Patient fell at home and suffered bilateral upper arm skin tears  · Dry dressing with nonstick gauze  · Neosporin  · No leukocytosis negative for fever  · Localized redness around skin tears  ? Swelling is likely related to IV infiltrated in the ED swelling was not evident prior to this          Ambulatory dysfunction   Assessment & Plan     · Patient experienced a fall at home  · Fall precautions  · Right hip hematoma  · CT of hip negative  · Consult PT          Acute renal failure superimposed on stage 3 chronic kidney disease (HCC)   Assessment & Plan     Stable          Acute diastolic CHF (congestive heart failure) ([facility])   Assessment & Plan     · Stable           VTE Prophylaxis: Apixaban (Eliquis)  / sequential compression device   Code Status:  Full  POLST: POLST form is not discussed and not completed at this time    Discussion with family:  Not available   Anticipated Length of Stay:  Patient will be admitted on an Inpatient basis with an anticipated length of stay of  > 2 midnights     Justification for Hospital Stay:  Skin tear wound dressings, evaluation of ambulatory dysfunction by Physical therapy      Admission Orders:  Scheduled Meds:   Current Facility-Administered Medications:  apixaban 2 5 mg Oral BID     atenolol 12 5 mg Oral BID     atorvastatin 10 mg Oral HS     calcium carbonate 1 tablet Oral Daily With Breakfast     cefTRIAXone 2,000 mg Intravenous Q24H  Last Rate: Stopped (09/09/18 1640)   clotrimazole-betamethasone  Topical BID     furosemide 40 mg Oral Daily     hydrochlorothiazide 50 mg Oral Daily     lisinopril 20 mg Oral Daily     LORazepam 1 mg Oral HS     multivitamin-minerals 1 tablet Oral Daily     neomycin-bacitracin-polymyxin b 1 small application Topical BID     oxyCODONE 5 mg Oral Q6H PRN     pantoprazole 40 mg Oral Early Morning     polyethylene glycol 17 g Oral Daily     potassium chloride 10 mEq Oral BID       Up w/ assist   Reg diet   PT eval   Serial trop - all wnl

## 2018-09-10 NOTE — DISCHARGE INSTRUCTIONS
Bacitracin/Neomycin/Polymyxin B (On the skin)   Bacitracin (bas-i-TRAY-sin), Neomycin (yhm-om-DFN-sin), Polymyxin B Sulfate (jayro-ee-MIX-in B SUL-fate)  Prevents infections caused by minor cuts, scrapes, and burns  Brand Name(s): All-Purpose First Aid Kit, Good Sense Triple Antibiotic, [facility] Triple Antibiotic Ointment, Medi-First Triple Antibiotic, Neosporin, [facility] First Aid Triple Antibiotic, [facility] Triple Antibiotic Ointment, Triple Antibiotic, Triple Antibiotic Ointment   There may be other brand names for this medicine  When This Medicine Should Not Be Used: You should not use this medicine if you have ever had an allergic reaction to bacitracin, neomycin, or polymyxin B  How to Use This Medicine:   Ointment, Cream  · Your doctor will tell you how much medicine to use  Do not use more than directed  · Follow the instructions on the medicine label if you are using this medicine without a prescription  · FOR USE ON THE SKIN ONLY  Do not use this medicine in or around your eyes or over large areas of your body  This medicine should not be used in your ears if your eardrum is perforated (torn)  · Clean the wound and apply a small amount of ointment or cream  You may cover the wound with a bandage after putting on the medicine  If a dose is missed:   · Take a dose as soon as you remember  If it is almost time for your next dose, wait until then and take a regular dose  Do not take extra medicine to make up for a missed dose  How to Store and Dispose of This Medicine:   · Store the medicine in a closed container at room temperature, away from heat, moisture, and direct light  · Ask your pharmacist, doctor, or health caregiver about the best way to dispose of any outdated medicine or medicine no longer needed  · Keep all medicine out of the reach of children  Never share your medicine with anyone    Drugs and Foods to Avoid:      Ask your doctor or pharmacist before using any other medicine, including over-the-counter medicines, vitamins, and herbal products  Warnings While Using This Medicine:   · For more serious wounds such as puncture wounds, animal bites, or severe burns, you should call your doctor before using this medicine  · You should not use this medicine for more than 7 days, unless your doctor has told you to  If your symptoms do not get better or if they get worse, stop using this medicine and call your doctor  · If you are pregnant or breastfeeding, talk to your doctor before using this medicine  Possible Side Effects While Using This Medicine:   Call your doctor right away if you notice any of these side effects:  · Rash, hives, swelling, or itching that was not there before you started the medicine  If you notice these less serious side effects, talk with your doctor:   · Minor skin tingling after putting on the medicine  If you notice other side effects that you think are caused by this medicine, tell your doctor  Call your doctor for medical advice about side effects  You may report side effects to FDA at 6-008-FDA-2201  © 2017 [employer] [employer] Information is for End User's use only and may not be sold, redistributed or otherwise used for commercial purposes  The above information is an  only  It is not intended as medical advice for individual conditions or treatments  Talk to your doctor, nurse or pharmacist before following any medical regimen to see if it is safe and effective for you

## 2018-09-10 NOTE — PLAN OF CARE
Problem: PHYSICAL THERAPY ADULT  Goal: Performs mobility at highest level of function for planned discharge setting  See evaluation for individualized goals  Treatment/Interventions: Functional transfer training, LE strengthening/ROM, Elevations, Therapeutic exercise, Endurance training, Patient/family training, Equipment eval/education, Bed mobility, Gait training, Spoke to nursing, Spoke to case management, Spoke to MD, Family  Equipment Recommended: Shweta Edwards (continue trials with RW)       See flowsheet documentation for full assessment, interventions and recommendations  Prognosis: Good  Problem List: Decreased strength, Decreased endurance, Impaired balance, Decreased mobility, Pain, Decreased skin integrity  Assessment: Pt is 80 y o  male seen for PT evaluation on 9/10/2018 s/p admit to [facility] on 9/9/2018 w/ Skin tear of upper arm without complication  Pt presents s/p fall  ~2 weeks ago in his kitchen, injuring his R hip  Pt notes difficulty getting up at that time, has been ambulatory since but still c/o pain to R hip  Imaging (-) fx  PT consulted to assess pt's functional mobility and d/c needs  Order placed for PT eval and tx, w/ up w/ A order  Performed at least 2 patient identifiers during session: Name and wristband  Comorbidities affecting pt's physical performance at time of assessment include: A fib, h/o R hip surgery  PTA, pt was independent w/ all functional mobility w/ no AD/DME, ambulates unrestricted distances and all terrain, has 3 ARASH, lives w/ wife and dtr in 2 level home (1st floor set up) and retired  Since falling 2 weeks ago, pt has required use of RW, typically does not need any AD for ambulation  Personal factors affecting pt at time of IE include: inaccessible home environment, ambulating w/ assistive device, stairs to enter home and positive fall history   Please find objective findings from PT assessment regarding body systems outlined above with impairments and limitations including weakness, impaired balance, decreased endurance, gait deviations, pain, decreased activity tolerance and fall risk, as well as mobility assessment (need for SBA assist w/ all phases of mobility when usually ambulating independently and need for cueing for mobility technique)  The following objective measures performed on IE also reveal limitations: Barthel Index: 80/100, Modified Ravi: 3 (moderate disability) and Tinetti/ILAN: 18/28 (high risk for falls)  Pt's clinical presentation is currently unstable/unpredictable seen in pt's presentation of advanced age, (+) fall history, need for input for task focus and mobility technique, R hip and chronic back pain impacting overall mobility status and ongoing medical assessment  Pt to benefit from continued PT tx to address deficits as defined above and maximize level of functional independent mobility and consistency  From PT/mobility standpoint, recommendation at time of d/c would be Home PT with family support pending progress in order to facilitate return to PLOF  Barriers to Discharge: None  Barriers to Discharge Comments: pt reports dtr home most of the time  Recommendation: Home PT, Home with family support (anticipated pending progress)     PT - OK to Discharge: No (pt to clear stairs)    See flowsheet documentation for full assessment

## 2018-09-10 NOTE — PLAN OF CARE
Problem: DISCHARGE PLANNING - CARE MANAGEMENT  Goal: Discharge to post-acute care or home with appropriate resources  INTERVENTIONS:  - Conduct assessment to determine patient/family and health care team treatment goals, and need for post-acute services based on payer coverage, community resources, and patient preferences, and barriers to discharge  - Address psychosocial, clinical, and financial barriers to discharge as identified in assessment in conjunction with the patient/family and health care team  - Arrange appropriate level of post-acute services according to patients   needs and preference and payer coverage in collaboration with the physician and health care team  - Communicate with and update the patient/family, physician, and health care team regarding progress on the discharge plan  - Arrange appropriate transportation to post-acute venues  Outcome: Adequate for Discharge  Home with vna,  Family transporting him home  Cm verified address and contact number

## 2018-09-10 NOTE — SOCIAL WORK
Cm met with patient at bedside, patient alert and oriented during interview and accompanied by his daughter Sari Galan and wife  Patient reports residing with his daughter and wife in a functional home while using a rolling walker and has a bedside commode as well  Patient reports having adequate transportation to his MD appointments and able to complete his ADL's independently  Patient reports filling his prescriptions at [facility]  9th   With no co-payment barriers  Patient reports having hx with [facility] and would like a referral sent (referral sent this day)  Patient daughter transporting patient back home  CM reviewed discharge planning process including the following: identifying help at home, patient preference for discharge planning needs, pharmacy preference, and availability of treatment team to discuss questions or concerns patient and/or family may have regarding understanding medications and recognizing signs and symptoms once discharged  CM also encouraged patient to follow up with all recommended appointments after discharge  Patient advised of importance for patient and family to participate in managing patients medical well being  CM name and role reviewed  Discharge Checklist reviewed and CM will continue to monitor for progress toward discharge goals in nursing and provider rounds

## 2018-09-11 LAB
BACTERIA WND AEROBE CULT: NORMAL
GRAM STN SPEC: NORMAL
GRAM STN SPEC: NORMAL

## 2018-09-11 NOTE — PHYSICIAN ADVISOR
Current patient class: Observation  The patient is currently on Hospital Day: 2 at [address]        The patient was admitted to the hospital  on 9/9/18 at [id number] for the following diagnosis:  Hip pain [M25 559]  Hematoma of right hip [S70 01XA]  Cellulitis of right forearm [G79 996]  Multiple falls [R29 6]  Multiple skin tears [T14  8XXA]  Edema at injection site [R60 0]     After review of the relevant documentation, labs, vital signs and test results, the patient is most appropriate for OBSERVATION STATUS  Rationale is as follows: The patient is an 80-year-old male who presented to the hospital on September 9, 2018  He fell two weeks prior to this  He continued to experience pain and had bilateral upper arm skin tear is  He began using a walker as an assistive device after that fall  The patient was initially ordered inpatient status with a primary diagnosis of skin tear and ambulatory dysfunction  Heart failure and chronic kidney disease were stable per the initial note  The focus was on physical therapy and occupational therapy evaluation for safe discharge plan  The recommendation was for home physical therapy  The patient stabilized for discharge after one midnight  Utilization Management discussed the case with the provider prior to him leaving the hospital   It was agreed that observation level care was most appropriate for this patient given his diagnoses upon presentation as well as treatment plan  The two midnight benchmark would not be met and he did not have appropriate medical necessity for an inpatient stay  The status order was changed observation  I agree with this change from inpatient care to observation level care for the reasons indicated above        The patients vitals on arrival were ED Triage Vitals   Temperature Pulse Respirations Blood Pressure SpO2   09/09/18 1129 09/09/18 1129 09/09/18 1129 09/09/18 1129 09/09/18 1129   98 8 °F (37 1 °C) 95 18 (!) 180/81 99 %      Temp Source Heart Rate Source Patient Position - Orthostatic VS BP Location FiO2 (%)   09/09/18 1738 09/09/18 1221 09/09/18 1221 09/09/18 1221 --   Oral Monitor Lying Left arm       Pain Score       09/09/18 1405       8           History reviewed  No pertinent past medical history  History reviewed  No pertinent surgical history  Consults have been placed to:   None    Vitals:    09/09/18 1642 09/09/18 1738 09/09/18 2300 09/10/18 0700   BP: 170/73 142/77 148/66 155/64   BP Location: Left arm Right arm Right arm Left arm   Pulse: 73 81 70 66   Resp: 18 18 18 18   Temp:  98 4 °F (36 9 °C) 98 1 °F (36 7 °C) 98 2 °F (36 8 °C)   TempSrc:  Oral Oral Oral   SpO2: 97% 94% 96% 94%   Weight:       Height:  5' 7" (1 702 m)         Most recent labs:    Recent Labs      09/09/18   1235  09/09/18   1646   WBC  8 40   --    HGB  12 1   --    HCT  37 4   --    PLT  133*   --    K  4 3   --    NA  141   --    CALCIUM  8 8   --    BUN  27*   --    CREATININE  1 26   --    LIPASE  149   --    INR  1 19*   --    TROPONINI  0 03  0 04   AST  26   --    ALT  26   --    ALKPHOS  56   --        Scheduled Meds:  Continuous Infusions:  No current facility-administered medications for this encounter  PRN Meds:      Surgical procedures (if appropriate):

## 2018-09-26 PROBLEM — S70.01XA HEMATOMA OF RIGHT HIP: Status: ACTIVE | Noted: 2018-09-26
